# Patient Record
Sex: FEMALE | Race: WHITE
[De-identification: names, ages, dates, MRNs, and addresses within clinical notes are randomized per-mention and may not be internally consistent; named-entity substitution may affect disease eponyms.]

---

## 2017-03-07 ENCOUNTER — HOSPITAL ENCOUNTER (OUTPATIENT)
Dept: HOSPITAL 61 - PNCL | Age: 77
Discharge: HOME | End: 2017-03-07
Attending: ANESTHESIOLOGY
Payer: MEDICARE

## 2017-03-07 DIAGNOSIS — M50.10: ICD-10-CM

## 2017-03-07 DIAGNOSIS — M51.16: Primary | ICD-10-CM

## 2017-03-07 PROCEDURE — 62327 NJX INTERLAMINAR LMBR/SAC: CPT

## 2017-03-07 PROCEDURE — 62323 NJX INTERLAMINAR LMBR/SAC: CPT

## 2017-03-08 NOTE — PN
DATE:  03/07/2017



DIAGNOSES:

1.  Lumbar radiculopathy with lumbar degenerative disk disease.

2.  Cervical radiculopathy with cervical degenerative disk disease.



HISTORY OF PRESENT ILLNESS:  The patient is a 76-year-old female who returns for

followup, last seen on 10/24/2016.  The patient has undergone 2 cervical

epidural steroid injection, did very well with these, with about 50%-60%

improvement.  The patient reports that the pain in her neck is very tolerable at

this point, her main complaint now is her low back pain.  She has had this for

some years, but has been getting much worse in the left leg with radiation into

the anterior lateral thigh, posterior back, into the posterior gluteus, lateral

gluteus, lateral thigh, medial lower leg into the area of the medial knee on the

left side, mostly up in the anterior thigh and posterior low back.  The patient

reports it is 7-8 on a scale of 10 and describes it as "exhausting pain."  The

patient reports some dull aching, radiating, shooting pain in the left leg,

worse with walking and standing, occasionally worse with sitting as well, but

generally not awaken her from sleep at night.  The patient had a recent

abdominal CT to follow up on previous renal carcinoma, which was clear, but did

diagnose some diverticulosis and patient has been taking antibiotics for this,

finished this now, but had some significant diarrhea with the antibiotics.  She

is following up with her primary care physician who also encouraged her to

consult or have a referral for a gastroenterologist and this is long-term if she

desires.  The patient reports otherwise no new motor or sensory deficits, no new

bowel or bladder incontinence or other complaints.



PHYSICAL EXAMINATION:

VITAL SIGNS:  The patient's blood pressure is 140/68, pulse is 68, respirations

18, temperature 99.0 degrees Fahrenheit, height is 5 feet 2 inches, and weight

is 182 pounds.

GENERAL:  The patient is awake, alert, oriented, appropriate, very pleasant

demeanor.

HEENT:  Head shows normocephalic and atraumatic.  Extraocular movements are

intact and symmetrical.  Oral cavity shows mucous membranes moist and pink. 

Dentition is intact.

NECK:  Shows anterior throat supple without palpable lymphadenopathy noted. 

Swallow reflex is symmetrical.  Neck shows full rotational motion of cervical

spine, both laterally greater than 45 degrees with full extension, full flexion

without pain reported.

CHEST:  Shows normal on inspection.  Breath sounds are clear to auscultation

bilaterally.

HEART:  Shows S1 and S2 clear.  No murmurs are auscultated.

ABDOMEN:  Obese, soft, nontender, and nondistended.  No palpable organomegaly is

noted.  No rebound or guarding demonstrated.

BACK:  Shows spine grossly in midline, slight exaggeration of thoracic kyphosis

and mild flattening of lumbar lordotic curvature.  No previous bruises, lesions,

rashes or scars are noted in the lumbar distribution.  Paraspinous musculature

shows symmetrical in appearance.  With palpation, this is firm, some moderate

tenderness with palpation, but only diffusely in the lower lumbar distribution. 

No tenderness over the spinous processes.  No tenderness over the sacrum or

sacroiliac regions.  The patient shows good rotational motion of the lumbar

spine, both laterally as well as extension and flexion without significant

increase in pain.

EXTREMITIES:  Lower extremities show deep tendon reflexes 1+ in the patellar and

tendo calcaneus tendons.  Motor exam is strong with 5/5 dorsiflexion, extension

and 4/5 with left quadriceps and hamstring flexion with 5/5 on the right. 

Peripheral pulses are 1+ posterior tibial and dorsalis pedis pulses.  No

peripheral edema is noted.



Options were discussed with the patient and the patient's old chart was reviewed

as her current medication regimen updated.  Current review of systems updated

today as well.  We will proceed with a lumbar epidural steroid injection today

with fluoroscopic guidance.  Risks were again discussed including, but not

limited to bleeding, infection, possibility of epidural hematoma, subsequent

neurologic compromise, dural puncture, headaches, spinal cord and/or nerve

damage, side effects of steroid medication and poor results regarding pain

control.  The patient understands and wishes to proceed.  The patient will

return to clinic in approximately 2 weeks for followup, counseled on return

appointment, activity level and side effects to be aware of.  Also, ____ patient

is to ____ gastroenterologist for her recent diverticulosis diagnosis and she

will look into this and check with her primary physician for referral well.



DIAGNOSIS:  Lumbar radiculopathy with lumbar degenerative disease.



PROCEDURE:  Lumbar epidural steroid injection in translaminar approach at L4-L5

level using sterile fluoroscopic guidance and local anesthetic using a sterile

prep and drape.



MEDICATIONS INJECTED:  Depo-Medrol 120 mg plus 10 mL of preservative-free normal

saline and 2 mL of Isovue for contrast.



The patient's condition on discharge is stable.  The patient tolerated procedure

well, had no complications.

 



______________________________

DERIC BAUTISTA MD



DR:  JOURDAN/nicole  JOB#:  [id number] / [id number]

DD:  03/07/2017 10:28  DT:  03/08/2017 01:34

## 2017-06-19 ENCOUNTER — HOSPITAL ENCOUNTER (OUTPATIENT)
Dept: HOSPITAL 61 - PNCL | Age: 77
Discharge: HOME | End: 2017-06-19
Attending: ANESTHESIOLOGY
Payer: MEDICARE

## 2017-06-19 DIAGNOSIS — M50.123: Primary | ICD-10-CM

## 2017-06-19 DIAGNOSIS — M51.16: ICD-10-CM

## 2017-06-19 DIAGNOSIS — Z82.49: ICD-10-CM

## 2017-06-19 PROCEDURE — 62321 NJX INTERLAMINAR CRV/THRC: CPT

## 2017-06-20 NOTE — PN
DATE:  06/19/2017



PROGRESS NOTE FOR PAIN CLINIC



DIAGNOSES:

1.  Cervical radiculopathy with cervical degenerative disk disease.

2.  Lumbar radiculopathy with lumbar degenerative disk disease.



HISTORY OF PRESENT ILLNESS:  The patient is a 76-year-old female who returns for

followup status post lumbar epidural steroid injection x 1 on 03/07/2017.  The

patient reports that she did fairly well, but only for a limited time about 2

weeks.  The pain returned significantly in the low back and into the left

posterior gluteus and lateral anterior thigh and groin on the left side.  The

patient reports her main complaint, however, is her neck and left upper back and

shoulder.  The patient has done well with cervical epidural steroid injections

in the past and we had discussed this last time.  The patient returns reporting

significant pain in this region rated an 8 on a scale of 10 at its worst, is a 5

at its least.  It is aching, tight and constant, becoming more and more

noticeable with activities, but she was using her upper extremities, reaching

above her head or any repetitive motions with her arms or shoulders.  The

patient reports no new motor or sensory deficits, no new bowel or bladder

incontinence, but still significant pain is noted.



PHYSICAL EXAMINATION:

VITAL SIGNS:  The patient's blood pressure is 142/66, pulse 57, respirations are

18, temperature is 97.5 degrees Fahrenheit, height is 5 feet 2 inches, weight is

183 pounds.

GENERAL:  The patient is awake, alert, oriented, appropriate, has a very

pleasant demeanor.

HEENT:  Head shows normocephalic, atraumatic.  Extraocular movements are intact,

symmetrical.  Oral cavity has mucous membranes moist and pink.  Dentition is

intact.

NECK:  Shows anterior throat supple without palpable lymphadenopathy noted. 

Swallow reflex is symmetrical.

CHEST:  Shows normal on inspection.  Breath sounds are clear to auscultation

bilaterally.

HEART:  Shows S1 and S2 clear.

ABDOMEN:  Soft, nontender, nondistended.  No palpable organomegaly is noted.  No

rebound or guarding demonstrated.

BACK:  Shows spine grossly in the midline.  Cervical paraspinous muscle shows

symmetrical on inspection with moderate tenderness to palpation bilaterally in

the cervical paraspinous musculature without radiation.  This is _____ superior,

medial and lateral trapezius, more on the left than the right, but without

specific trigger points or radiation, but just diffuse tenderness in the

musculature.  The patient's low back shows some moderate tenderness with

palpation in the middle and lower distribution, but shows good rotational motion

without significant pain with mild pain with extension of the lumbar spine, but

not with forward flexion, right and left lateral rotation.

EXTREMITIES:  Upper extremities showed deep tendon reflexes at 2+ in the biceps

and triceps tendons.  Motor exam is strong with  strength rated at 5/5 at

his biceps and triceps flexion.  Lower extremities showed deep tendon reflexes

1+ in the patellar and tendo calcaneus tendons.  Motor exam is 5/5 at the ankles

and 4/5 with the left quadriceps and 5/5 on the right.



PLAN:  Options were discussed with the patient and the patient's old chart was

reviewed as her current medication regimen and updated.  Current review of

systems updated today as well.  We will proceed with a cervical epidural steroid

injection today with fluoroscopic guidance.  Risks were again discussed

including, but not limited to bleeding, infection, possibility of epidural

hematoma, subsequent neurologic compromise, dural puncture, headaches, spinal

cord and/or nerve damage, side effects of steroid medication and poor results

regarding pain control.  The patient understands and wishes to proceed.  The

patient will return to clinic in approximately 2 weeks for followup, was

counseled on return appointment, activity level and side effects to be aware of.



DIAGNOSES:  Cervical radiculopathy with cervical degenerative disk disease.



PROCEDURE:  Cervical epidural steroid injection in translaminar approach at

C6-C7 using C-arm fluoroscopic guidance under sterile prep and drape using a

local anesthetic.  Medications injected is 120 mg Depo-Medrol plus 5 mL of

preservative-free normal saline and 2 mL Isovue for contrast.



CONDITION AT DISCHARGE:  Stable.  The patient tolerated the procedure well, had

no complications.

 



______________________________

DERIC BAUTISTA MD



DR:  JOURDAN/nicole  JOB#:  [id number] / [id number]

DD:  06/19/2017 11:36  DT:  06/20/2017 05:41

## 2017-08-31 ENCOUNTER — HOSPITAL ENCOUNTER (OUTPATIENT)
Dept: HOSPITAL 61 - MRI | Age: 77
Discharge: HOME | End: 2017-08-31
Attending: GENERAL PRACTICE
Payer: MEDICARE

## 2017-08-31 DIAGNOSIS — K45.8: ICD-10-CM

## 2017-08-31 DIAGNOSIS — K86.9: Primary | ICD-10-CM

## 2017-08-31 PROCEDURE — 72197 MRI PELVIS W/O & W/DYE: CPT

## 2017-08-31 PROCEDURE — A9585 GADOBUTROL INJECTION: HCPCS

## 2017-08-31 PROCEDURE — 74183 MRI ABD W/O CNTR FLWD CNTR: CPT

## 2017-08-31 NOTE — RAD
MRI of the abdomen and pelvis without and with contrast 8/31/2017



Clinical history: History of left renal cell carcinoma post surgical removal.

Possible pancreatic lesion seen on recent CT scan.



Technique: Unenhanced T2-weighted axial, and fat-saturated T2-weighted axial

and coronal and in and out of phase T1-weighted axial and coronal images of

the abdomen were obtained. T1-weighted coronal axial and sagittal and fat

saturated T2-weighted coronal and axial images of the pelvis were obtained.

After the intravenous administration of 7.5 cc of Gadavist, enhanced fat

saturated T1-weighted axial and coronal images of the abdomen and pelvis were

obtained.



Findings: Comparison is made to patient's CT scan of the abdomen and pelvis

performed at [facility] on 8/25/2017.



Several rounded high signal intensity lesions are seen involving predominantly

the left lobe of the liver on the T2-weighted images. They measure 2 to 3 mm

in size. There are consistent with hepatic cysts. The spleen and adrenal

glands are within normal limits. A 6 mm rounded high signal intensity lesion

is seen involving the lower pole right kidney on the T2-weighted images. This

likely represents a cyst. The patient is post resection of a portion of the

lateral aspect of the left kidney. No recurrent/residual mass is seen. The

pancreas is within normal limits. No significant focal abnormality of the

pancreas is seen. The low-attenuation lesions in the patient's CT scan likely

represent small areas of focal fatty replacement.



The abdominal aorta is ectatic. A hernia is seen along the left lateral

anterior abdominal wall, unchanged. The gallbladder is well-distended. No free

fluid is seen within the abdomen. There is no evidence of bowel obstruction.



The urinary bladder is distended with urine. The uterus is within normal

limits. Multiple diverticula are seen along the sigmoid colon. No free fluid

is seen. No pelvic or inguinal lymphadenopathy is seen. Mild S-shaped

curvature of the thoracolumbar spine is seen.



No area of abnormal contrast enhancement is seen.



Impression:

1. No recurrent mass is seen within the left kidney.

2. No significant abnormality is seen involving the pancreas. The

low-attenuation lesions seen on the patient's recent CT scan involving the

pancreas are felt to most likely represent areas of fatty infiltration.

3. No acute abnormality is seen involving the pelvis.

## 2017-12-20 ENCOUNTER — HOSPITAL ENCOUNTER (OUTPATIENT)
Dept: HOSPITAL 61 - PNCL | Age: 77
Discharge: HOME | End: 2017-12-20
Attending: ANESTHESIOLOGY
Payer: MEDICARE

## 2017-12-20 DIAGNOSIS — M51.16: Primary | ICD-10-CM

## 2017-12-20 DIAGNOSIS — M50.10: ICD-10-CM

## 2017-12-20 PROCEDURE — 62323 NJX INTERLAMINAR LMBR/SAC: CPT

## 2017-12-20 NOTE — PAIN
DATE OF SERVICE:  12/20/2017



DIAGNOSES:

1.  Lumbar radiculopathy with lumbar degenerative disk disease.

2.  Cervical radiculopathy with cervical degenerative disk disease.



HISTORY OF PRESENT ILLNESS:  The patient is a 77-year-old female who returns for

a followup, last seen on 06/19/2017, had a cervical epidural steroid injection

and lumbar epidural steroid injection prior to that in March of this year.  The

patient did very well with each of these, reports about 75% improvement overall.

 Pain is beginning to return now in her low back and into her left leg, but only

to a moderate extent.  The patient reports it has been increasing gradually over

time, not a result of any specific new injury or action that she is aware of,

but has becoming more noticeable across the low back, into the posterior gluteus

bilaterally, posterior thighs and left posterior thigh and posterior calf on

occasion; worse with walking, standing, changing positions; better with sitting

or lying down, but awakens her from sleep occasionally, but not every night. 

She usually sleeps about 8 hours at a time without difficulty, sometimes longer.

 The patient reports the pain is at 8 on a scale of 10 in her low back and left

leg, is a 7 on average and a 3-4 at its least and is a 5 today.  The patient

reports it is aching, tight, becoming more shooting, dull, radiating; again

worse with walking, standing, changing positions; better with sitting or lying

down.  The patient reports no new motor or sensory deficits.  The patient's neck

and upper shoulder is doing much better since her injection back in June of this

year and reports that it is doing quite well.  The patient reports no other

complaints, no new motor or sensory deficits, bowel or bladder incontinence.



PHYSICAL EXAMINATION:

VITAL SIGNS:  The patient's blood pressure 151/69, pulse 63, respirations are

16, temperature is 97.9 degrees Fahrenheit.  She is 5 feet 2 inches, weighs 187

pounds.

GENERAL:  The patient is awake, alert, oriented, appropriate, very pleasant

demeanor.

HEENT:  Shows normocephalic, atraumatic.  Extraocular muscles are intact and

symmetrical.  Oral cavity shows mucous membranes moist and pink.  Dentition is

intact.

NECK:  Shows anterior throat supple without palpable lymphadenopathy noted. 

Swallow reflex is symmetrical.

CHEST:  Normal with inspection.  Breath sounds clear to auscultation

bilaterally.

HEART:  Shows S1, S2 clear.  No murmurs auscultated.

ABDOMEN:  Obese, but soft, nontender, nondistended.  No palpable organomegaly is

noted.  No rebound or guarding demonstrated.

MUSCULOSKELETAL:  Back shows spine grossly in the midline.  Normal appearing

thoracic kyphosis and some mild flattening of the lumbar lordotic curvature. 

Lumbar paraspinous muscle shows symmetrical on inspection.  No difficulty with

palpation in the upper aspect, lower aspect is moderately tender with palpation

bilaterally in the lumbar paraspinous muscles, slightly worse on the left than

the right.  No tenderness over the sacrum or sacroiliac regions.  Lower

extremities show deep tendon reflexes at 1+ in the patellar and tendo-calcaneus

tendons and are equal.  Motor exam is strong with approximately 4 on a scale of

5 with dorsiflexion, extension on the left and 5/5 on the right.  Peripheral

pulses are 1+, posterior tibial.  No peripheral edema is noted.  No clubbing or

cyanosis bilaterally.



PLAN:  Options were discussed with the patient.  The patient's old chart was

reviewed as her current medication regimen and updated.  Current review of

systems updated today as well.  We will proceed with a lumbar epidural steroid

injection today; it is the first in this series with fluoroscopic guidance. 

Risks were again discussed including, but not limited to bleeding, infection,

possibility of epidural hematoma and subsequent neurological compromise, dural

puncture headaches, spinal cord and/or nerve damage, side effects of steroid

medication and poor results regarding pain control.  The patient understands and

wished to proceed.  The patient to return to the clinic in approximately 2 weeks

for a followup, was counseled on return appointment, activity level and side

effects to be aware of.



DIAGNOSES:

1.  Lumbar radiculopathy with lumbar degenerative disk disease.

2.  Cervical radiculopathy with cervical degenerative disk disease.



PROCEDURE:  Lumbar epidural steroid injection, translaminar approach, L4-L5

level using C-arm fluoroscopic guidance under sterile prep and drape using local

anesthetic.



MEDICATIONS INJECTED:  A total of 120 mg of Depo-Medrol plus 10 mL of

preservative-free normal saline and 2 mL of Isovue for contrast.



CONDITION AT DISCHARGE:  Stable.  The patient tolerated the procedure well, had

no complications.

 



______________________________

DERIC BAUTISTA MD



DR:  JOURDAN/nicole  JOB#:  [id number] / [id number]

DD:  12/20/2017 10:48  DT:  12/20/2017 18:40

## 2018-12-18 ENCOUNTER — HOSPITAL ENCOUNTER (OUTPATIENT)
Dept: HOSPITAL 61 - PNCL | Age: 78
End: 2018-12-18
Attending: ANESTHESIOLOGY
Payer: MEDICARE

## 2018-12-18 DIAGNOSIS — M50.10: ICD-10-CM

## 2018-12-18 DIAGNOSIS — M51.16: Primary | ICD-10-CM

## 2018-12-18 PROCEDURE — 62323 NJX INTERLAMINAR LMBR/SAC: CPT

## 2018-12-18 NOTE — PAIN
DATE OF SERVICE:  12/18/2018



PROGRESS NOTE FOR PAIN CLINIC



DIAGNOSES:

1.  Lumbar radiculopathy with lumbar degenerative disk disease.

2.  Cervical radiculopathy with cervical degenerative disk disease.



HISTORY OF PRESENT ILLNESS:  The patient is a 78-year-old female who returns for

followup status post lumbar epidural steroid injection x 1 on 09/10/2018.  The

patient did quite well with this with about a 60% improvement until about 2

weeks ago.  The patient reports the pain began to return, was increased in the

low back into the left greater than right lower extremity, mostly in the

posterior gluteus, posterior thigh, lateral thigh, lateral anterior thigh,

medial thigh, medial knee on the left side, worse with walking, standing,

changing positions, worse with activity.  Initially, she was increasing her

activity with greater ease and comfort, walking greater distances, doing work

activities, household activities, sleeping well at night.  The patient reports

it is beginning to awaken her from sleep occasionally, but not every night.  The

patient reports the pain is aching, tight across the low back into the posterior

left lower extremity and hip as described.  The patient reports it is a 9 on a

scale of 10 at its worst and average, at 8 on its least and is a 9 today.  The

patient reports no new motor or sensory deficits, no new bowel or bladder

incontinence or other complaints.



PHYSICAL EXAMINATION:

VITAL SIGNS:  The patient's blood pressure is 161/76, pulse 66, respirations 18,

temperature 98.1 degrees Fahrenheit, height is 5 feet 2 inches, weight is 192

pounds.

GENERAL:  The patient is awake, alert, oriented, appropriate, very pleasant

demeanor.

HEENT:  Head shows normocephalic, atraumatic.  Extraocular movements are intact

and symmetrical.  Oral cavity:  Mucous membranes are moist and pink.  Dentition

is intact.

NECK:  Shows anterior throat supple without palpable lymphadenopathy noted. 

Swallow reflex is symmetrical.

CHEST:  Shows normal on inspection.  Breath sounds are clear to auscultation

bilaterally.

HEART:  Shows S1, S2 clear.  No murmurs auscultated.

ABDOMEN:  Soft, nontender, nondistended.  No palpable organomegaly is noted.  No

rebound or guarding demonstrated.

BACK:  Shows spine grossly in the midline.  Normal appearing thoracic kyphosis

and some minor flattening of lumbar lordotic curvature.  Lumbar paraspinous

muscle shows symmetrical on inspection, on palpation shows some moderate

tenderness bilaterally, but only diffusely without radiation.

EXTREMITIES:  Lower extremities show deep tendon reflexes 1+ in the patellar and

tendo-calcaneus tendons.  Motor exam is approximately 4 on a scale of 5 on the

left with dorsiflexion and extension, and 5/5 on the right, but equal and

symmetrical right and left sides with quadriceps and hamstring flexion. 

Peripheral pulses are 1+ posterior tibia.  No peripheral edema is noted

bilaterally.



Options were discussed with the patient.  The patient's old chart was reviewed

as her current medication regimen updated.  Current review of systems updated

today as well.  We will proceed with a second in the series of lumbar epidural

steroid injection today with fluoroscopic guidance.  Risks were again discussed

including, but not limited to bleeding, infection, possibility of epidural

hematoma, subsequent neurological compromise, dural puncture, headaches, spinal

cord and/or nerve damage, side effects of steroid medication and poor results

regarding pain control.  The patient understands and wished to proceed.  The

patient will return to clinic in approximately 2 weeks for followup, was

counseled on return appointment, activity level and side effects to be aware of.



DIAGNOSIS:  Lumbar radiculopathy with lumbar degenerative disk disease.



PROCEDURE:  Lumbar epidural steroid injection in translaminar approach at L4-L5

level using C-arm fluoroscopic guidance under sterile prep and drape using local

anesthetic.



MEDICATION INJECTED:  A total of 120 mg Depo-Medrol plus 10 mL of

preservative-free normal saline and 2 mL of Isovue for contrast.



CONDITION AT DISCHARGE:  Stable.  The patient tolerated the procedure well, had

no complications.

 



______________________________

DERIC BAUTISTA MD



DR:  JOURDAN/nicole  JOB#:  [id number] / [id number]

DD:  12/18/2018 11:32  DT:  12/18/2018 13:31

## 2019-01-07 ENCOUNTER — HOSPITAL ENCOUNTER (OUTPATIENT)
Dept: HOSPITAL 61 - PNCL | Age: 79
Discharge: HOME | End: 2019-01-07
Attending: ANESTHESIOLOGY
Payer: MEDICARE

## 2019-01-07 DIAGNOSIS — M50.10: Primary | ICD-10-CM

## 2019-01-07 DIAGNOSIS — M51.16: ICD-10-CM

## 2019-01-07 PROCEDURE — G0463 HOSPITAL OUTPT CLINIC VISIT: HCPCS

## 2019-01-07 NOTE — PAIN
DATE OF SERVICE:  01/07/2019



PROGRESS NOTE FOR PAIN CLINIC



DIAGNOSES:

1.  Cervical radiculopathy with cervical degenerative disk disease.

2.  Lumbar radiculopathy with lumbar degenerative disk disease.



HISTORY OF PRESENT ILLNESS:  The patient is a 78-year-old female who returns for

followup status post lumbar epidural steroid injection x 2, on 09/10 and then on

12/18/2018.  The patient did very well with about a 70% improvement.  Reports

still 70% improvement in the low back and left lower extremity.  The patient

reports the pain in her leg is not completely gone, just some pain across the

low back, which she reports is very tolerable.  The patient reports she is

increasing her activity, greater distance walking, able to return to activities

at home as well as household activities and traveling with much greater ease and

comfort.  The patient is sleeping well at night, does not awaken her from sleep

at night.  The patient reports her pain is 0-1 at its worst, average and at its

least is a 0 and is a 0 today.  The patient reports much better with sitting,

but worse with standing and walking for more than about 15-30 minutes. 

Otherwise, the patient is doing quite well.  The patient reports again no pain

in the left leg, has resolved since her last visit.  The patient reports no new

motor or sensory deficits, no new bowel or bladder incontinence or other

complaints.



PHYSICAL EXAMINATION:

VITAL SIGNS:  The patient's blood pressure is 149/56, pulse 55, respirations 16,

temperature 98.0 degrees Fahrenheit, height is 5 feet 2 inches, weight is 191

pounds.

GENERAL:  The patient is awake, alert, oriented, appropriate, very pleasant

demeanor.

HEENT:  Shows normocephalic, atraumatic.  Extraocular movements are intact and

symmetrical.  The patient wears eye glasses.  Oral cavity:  Mucous membranes are

moist and pink.  Dentition is intact.

NECK:  Shows anterior throat supple without palpable lymphadenopathy noted. 

Swallow reflex is symmetrical.

CHEST:  Shows normal on inspection.  Breath sounds are clear to auscultation

bilaterally.

HEART:  Shows S1, S2 clear.  No murmurs auscultated.

ABDOMEN:  Soft, nontender, nondistended.  No palpable organomegaly is noted.  No

rebound or guarding demonstrated.

BACK:  Shows spine grossly in the midline.  Slight exaggeration of thoracic

kyphosis and some minor flattening of lumbar lordotic curvature.  Lumbar

paraspinous muscle shows symmetrical on inspection, with palpation shows some

moderate tenderness diffusely bilaterally, but only diffusely without radiation

and without trigger points.  The patient has good rotational motion of lumbar

spine, both laterally greater than 10 degrees right and left as well as

extension greater than 10 degrees, forward flexion 45 degrees without

significant pain reported.

EXTREMITIES:  Lower extremities show deep tendon reflexes 1+ in the patellar and

tendo calcaneus tendons, are equal.  Motor exam is approximately 4 on a scale of

5 with left dorsiflexion and extension, and 5/5 on the right, but intact. 

Peripheral pulses are 1+ posterior tibia.  No peripheral edema is noted

bilaterally.



Options were discussed with the patient and the patient's  who

accompanies her to this visit today.  Her old chart was reviewed as is her

current medication regimen updated and current review of systems updated today

as well.  We will hold on any further injections at this time as the patient is

doing quite a bit better and thus we would like to hold on any further

intervention.  The patient is taking Aleve twice daily.  Reports, she did quite

well with this without side effects.  The patient was encouraged to increase her

activity as tolerated, walking greater distances as tolerated as well with

caution with bending, flexing, stooping with the low back.  Otherwise, the

patient will follow up on an as needed basis at this time.

 



______________________________

DERIC BAUTISTA MD



DR:  JOURDAN/nicole  JOB#:  [id number] / [id number]

DD:  01/07/2019 10:22  DT:  01/07/2019 16:56



Haleigh Carpio MD